# Patient Record
Sex: MALE | URBAN - METROPOLITAN AREA
[De-identification: names, ages, dates, MRNs, and addresses within clinical notes are randomized per-mention and may not be internally consistent; named-entity substitution may affect disease eponyms.]

---

## 2020-06-30 ENCOUNTER — APPOINTMENT (OUTPATIENT)
Dept: URBAN - METROPOLITAN AREA CLINIC 272 | Age: 60
Setting detail: DERMATOLOGY
End: 2020-07-02

## 2020-06-30 DIAGNOSIS — B35.4 TINEA CORPORIS: ICD-10-CM

## 2020-06-30 DIAGNOSIS — D485 NEOPLASM OF UNCERTAIN BEHAVIOR OF SKIN: ICD-10-CM

## 2020-06-30 PROBLEM — D48.5 NEOPLASM OF UNCERTAIN BEHAVIOR OF SKIN: Status: ACTIVE | Noted: 2020-06-30

## 2020-06-30 PROCEDURE — 69100 BIOPSY OF EXTERNAL EAR: CPT

## 2020-06-30 PROCEDURE — OTHER BIOPSY BY SHAVE METHOD: OTHER

## 2020-06-30 PROCEDURE — 99202 OFFICE O/P NEW SF 15 MIN: CPT | Mod: 25

## 2020-06-30 PROCEDURE — OTHER PRESCRIPTION: OTHER

## 2020-06-30 RX ORDER — CICLOPIROX OLAMINE 7.7 MG/G
CREAM TOPICAL
Qty: 1 | Refills: 2 | Status: ERX | COMMUNITY
Start: 2020-06-30

## 2020-06-30 ASSESSMENT — LOCATION SIMPLE DESCRIPTION DERM: LOCATION SIMPLE: LEFT EAR

## 2020-06-30 ASSESSMENT — LOCATION DETAILED DESCRIPTION DERM: LOCATION DETAILED: LEFT SUPERIOR HELIX

## 2020-06-30 ASSESSMENT — LOCATION ZONE DERM: LOCATION ZONE: EAR

## 2020-06-30 NOTE — PROCEDURE: BIOPSY BY SHAVE METHOD
Depth Of Biopsy: dermis
Additional Anesthesia Volume In Cc (Will Not Render If 0): 0
Validate Triangulation: No
Billing Type: Third-Party Bill
Biopsy Type: H and E
Detail Level: Detailed
Wound Care: Petrolatum
Consent: Written consent was obtained and risks were reviewed including but not limited to scarring, infection, bleeding, scabbing, incomplete removal, nerve damage and allergy to anesthesia.
Electrodesiccation Text: The wound bed was treated with electrodesiccation after the biopsy was performed.
Anesthesia Type: 1% lidocaine with epinephrine
Cryotherapy Text: The wound bed was treated with cryotherapy after the biopsy was performed.
Curettage Text: The wound bed was treated with curettage after the biopsy was performed.
Validate Note Data (See Information Below): Yes
Information: Selecting Yes will display possible errors in your note based on the variables you have selected. This validation is only offered as a suggestion for you. PLEASE NOTE THAT THE VALIDATION TEXT WILL BE REMOVED WHEN YOU FINALIZE YOUR NOTE. IF YOU WANT TO FAX A PRELIMINARY NOTE YOU WILL NEED TO TOGGLE THIS TO 'NO' IF YOU DO NOT WANT IT IN YOUR FAXED NOTE.
Silver Nitrate Text: The wound bed was treated with silver nitrate after the biopsy was performed.
Dressing: bandage
Size Of Lesion In Cm: 0.9
Hemostasis: Electrocautery
Type Of Destruction Used: Curettage
Electrodesiccation And Curettage Text: The wound bed was treated with electrodesiccation and curettage after the biopsy was performed.
Biopsy Method: 10 blade
Anesthesia Volume In Cc (Will Not Render If 0): 0.5
Notification Instructions: Patient will be notified of biopsy results. However, patient instructed to call the office if not contacted within 2 weeks.
Post-Care Instructions: I reviewed with the patient in detail post-care instructions. Patient is to keep the biopsy site dry overnight, and then apply bacitracin twice daily until healed. Patient may apply hydrogen peroxide soaks to remove any crusting.

## 2020-07-20 ENCOUNTER — RX ONLY (RX ONLY)
Age: 60
End: 2020-07-20

## 2020-07-20 ENCOUNTER — APPOINTMENT (OUTPATIENT)
Dept: URBAN - METROPOLITAN AREA CLINIC 272 | Age: 60
Setting detail: DERMATOLOGY
End: 2020-07-20

## 2020-07-20 PROBLEM — C44.229 SQUAMOUS CELL CARCINOMA OF SKIN OF LEFT EAR AND EXTERNAL AURICULAR CANAL: Status: ACTIVE | Noted: 2020-07-20

## 2020-07-20 PROCEDURE — 17311 MOHS 1 STAGE H/N/HF/G: CPT

## 2020-07-20 PROCEDURE — OTHER MOHS SURGERY: OTHER

## 2020-07-20 RX ORDER — DOXYCYCLINE HYCLATE 100 MG/1
CAPSULE, GELATIN COATED ORAL
Qty: 14 | Refills: 0 | Status: ERX | COMMUNITY
Start: 2020-07-20

## 2020-07-20 NOTE — PROCEDURE: MOHS SURGERY
Ear Wedge Repair Text: A wedge excision was completed by carrying down an excision through the full thickness of the ear and cartilage with an inward facing Burow's triangle. The wound was then closed in a layered fashion. Discharged

## 2020-07-20 NOTE — PROCEDURE: MOHS SURGERY
REID Wolfe Epidermal Autograft Text: The defect edges were debeveled with a #15 scalpel blade.  Given the location of the defect, shape of the defect and the proximity to free margins an epidermal autograft was deemed most appropriate.  Using a sterile surgical marker, the primary defect shape was transferred to the donor site. The epidermal graft was then harvested.  The skin graft was then placed in the primary defect and oriented appropriately.

## 2020-07-20 NOTE — PROCEDURE: MOHS SURGERY
5U PA O-Z Flap Text: The defect edges were debeveled with a #15 scalpel blade.  Given the location of the defect, shape of the defect and the proximity to free margins an O-Z flap was deemed most appropriate.  Using a sterile surgical marker, an appropriate transposition flap was drawn incorporating the defect and placing the expected incisions within the relaxed skin tension lines where possible. The area thus outlined was incised deep to adipose tissue with a #15 scalpel blade.  The skin margins were undermined to an appropriate distance in all directions utilizing iris scissors.

## 2020-07-20 NOTE — PROCEDURE: MOHS SURGERY
Assessment/Plan:  Fingerstick hemoglobin A1c  6 6  Patient to continue present treatment  Patient instructed to follow a low-fat, low-salt and a low sugar/carbohydrate diet and get regular exercise walking with use of his cane as tolerated  Recommend home glucose monitoring regularly  Patient instructed to elevate legs p r n  Tobias Mack Return to the office in 3 months  Diabetes mellitus type 2, uncontrolled ([address] )  Lab Results   Component Value Date    HGBA1C 6 4 (H) 01/05/2018    Stable with last hemoglobin A1c 6 4  Fingerstick hemoglobin A1c obtain today  Continue present treatment and follow a low sugar and low-carbohydrate diet  Recommend home glucose monitoring  No results for input(s): POCGLU in the last 72 hours  Blood Sugar Average: Last 72 hrs:      Arteriosclerotic coronary artery disease  Stable  Continue present treatment  Hypertension  BP controlled  Continue present treatment and follow a low-salt diet more carefully  Osteoarthritis  Symptomatic  Continue present treatment  Hyperlipidemia  Lipids well controlled on generic Vytorin  Continue present treatment and follow a low-fat low-cholesterol diet  Vitamin D deficiency  Continue vitamin-D supplement  Diagnoses and all orders for this visit:    Uncontrolled type 2 diabetes mellitus with diabetic neuropathy, without long-term current use of insulin (HCC)  -     HEMOGLOBIN A1C W/ EAG ESTIMATION  -     POCT hemoglobin A1c    Essential hypertension    Hyperlipidemia, unspecified hyperlipidemia type    Primary osteoarthritis involving multiple joints    Arteriosclerotic coronary artery disease    Spinal stenosis of lumbar region with neurogenic claudication    Vitamin D deficiency          Subjective:      Patient ID: Gonzalez Timmons is a 78 y o  male  Patient is here for routine appointment for chronic conditions and he is not fasting today    Patient was seen in emergency room 1 month ago for dizziness and had labs done at that time   Dizziness has since resolved  Patient has been feeling fairly well overall  No regular exercise program although patient remains fairly active doing [employer] work  He is limited in his activities secondary to arthritis  Home glucose monitoring done rarely reveals fasting blood sugars 150-160  Patient is up-to-date on diabetic eye exam and foot exam       Diabetes   He presents for his follow-up diabetic visit  He has type 2 diabetes mellitus  His disease course has been stable  There are no hypoglycemic associated symptoms  Pertinent negatives for hypoglycemia include no headaches  Associated symptoms include foot paresthesias  Pertinent negatives for diabetes include no blurred vision, no chest pain, no fatigue, no foot ulcerations, no polydipsia, no polyuria, no visual change, no weakness and no weight loss  There are no hypoglycemic complications  Symptoms are stable  Diabetic complications include heart disease, peripheral neuropathy and PVD  Pertinent negatives for diabetic complications include no CVA  Risk factors for coronary artery disease include diabetes mellitus, dyslipidemia, hypertension, male sex and tobacco exposure  Current diabetic treatment includes oral agent (dual therapy)  He is compliant with treatment all of the time  His weight is stable  He is following a generally healthy diet  He participates in exercise intermittently  His breakfast blood glucose is taken between 7-8 am  His breakfast blood glucose range is generally 140-180 mg/dl  An ACE inhibitor/angiotensin II receptor blocker is being taken  He sees a podiatrist Eye exam is current  Hypertension   This is a chronic problem  The problem is controlled  Associated symptoms include peripheral edema  Pertinent negatives include no anxiety, blurred vision, chest pain, headaches, malaise/fatigue, orthopnea, palpitations, PND or shortness of breath  Past treatments include ACE inhibitors, diuretics and beta blockers   The current treatment provides significant improvement  There are no compliance problems  Hypertensive end-organ damage includes CAD/MI and PVD  There is no history of CVA  Hyperlipidemia   This is a chronic problem  The problem is controlled  Recent lipid tests were reviewed and are normal  Exacerbating diseases include diabetes  He has no history of hypothyroidism  Associated symptoms include a focal sensory loss and leg pain  Pertinent negatives include no chest pain, focal weakness, myalgias or shortness of breath  Current antihyperlipidemic treatment includes ezetimibe and statins  The current treatment provides significant improvement of lipids  There are no compliance problems  The following portions of the patient's history were reviewed and updated as appropriate: allergies, current medications, past family history, past medical history, past social history, past surgical history and problem list     Review of Systems   Constitutional: Negative for fatigue, malaise/fatigue and weight loss  Eyes: Negative for blurred vision  Respiratory: Negative for shortness of breath  Cardiovascular: Negative for chest pain, palpitations, orthopnea and PND  Endocrine: Negative for polydipsia and polyuria  Musculoskeletal: Negative for myalgias  Neurological: Negative for focal weakness, weakness and headaches  Objective:      /68   Pulse 64   Temp 97 7 °F (36 5 °C) (Tympanic)   Resp 16   Ht 5' 8 5" (1 74 m)   Wt 86 2 kg (190 lb)   BMI 28 47 kg/m²          Physical Exam   Constitutional: He is oriented to person, place, and time  He appears well-developed and well-nourished  HENT:   Head: Normocephalic  Right Ear: External ear normal    Left Ear: External ear normal    Nose: Nose normal    Mouth/Throat: Oropharynx is clear and moist    Eyes: Conjunctivae are normal  No scleral icterus  Neck: Neck supple  No thyromegaly present  Cardiovascular: Normal rate and regular rhythm  Pulmonary/Chest: Effort normal and breath sounds normal    Abdominal: Soft  There is no tenderness  Musculoskeletal: He exhibits edema and tenderness  Lymphadenopathy:     He has no cervical adenopathy  Neurological: He is alert and oriented to person, place, and time  Skin: Skin is warm and dry  Psychiatric: He has a normal mood and affect  Rotation Flap Text: The defect edges were debeveled with a #15 scalpel blade.  Given the location of the defect, shape of the defect and the proximity to free margins a rotation flap was deemed most appropriate.  Using a sterile surgical marker, an appropriate rotation flap was drawn incorporating the defect and placing the expected incisions within the relaxed skin tension lines where possible.    The area thus outlined was incised deep to adipose tissue with a #15 scalpel blade.  The skin margins were undermined to an appropriate distance in all directions utilizing iris scissors.

## 2021-08-06 ENCOUNTER — APPOINTMENT (OUTPATIENT)
Dept: URBAN - METROPOLITAN AREA CLINIC 272 | Age: 61
Setting detail: DERMATOLOGY
End: 2021-08-07

## 2021-08-06 DIAGNOSIS — T300 BLISTERS, EPIDERMAL LOSS [SECOND DEGREE], UNSPECIFIED SITE: ICD-10-CM

## 2021-08-06 DIAGNOSIS — B35.4 TINEA CORPORIS: ICD-10-CM

## 2021-08-06 DIAGNOSIS — I87.2 VENOUS INSUFFICIENCY (CHRONIC) (PERIPHERAL): ICD-10-CM

## 2021-08-06 PROBLEM — T24.232A BURN OF SECOND DEGREE OF LEFT LOWER LEG, INITIAL ENCOUNTER: Status: ACTIVE | Noted: 2021-08-06

## 2021-08-06 PROBLEM — T24.231A BURN OF SECOND DEGREE OF RIGHT LOWER LEG, INITIAL ENCOUNTER: Status: ACTIVE | Noted: 2021-08-06

## 2021-08-06 PROCEDURE — OTHER PHOTO-DOCUMENTATION: OTHER

## 2021-08-06 PROCEDURE — OTHER COUNSELING: OTHER

## 2021-08-06 PROCEDURE — OTHER PRESCRIPTION: OTHER

## 2021-08-06 PROCEDURE — OTHER ADDITIONAL NOTES: OTHER

## 2021-08-06 PROCEDURE — 99214 OFFICE O/P EST MOD 30 MIN: CPT

## 2021-08-06 RX ORDER — ECONAZOLE NITRATE 10 MG/G
CREAM TOPICAL
Qty: 1 | Refills: 2 | Status: ERX | COMMUNITY
Start: 2021-08-06

## 2021-08-06 RX ORDER — MUPIROCIN 20 MG/G
OINTMENT TOPICAL
Qty: 2 | Refills: 3 | Status: ERX | COMMUNITY
Start: 2021-08-06

## 2021-08-06 RX ORDER — PENTOXIFYLLINE 400 MG/1
TABLET, EXTENDED RELEASE ORAL
Qty: 90 | Refills: 3 | Status: ERX | COMMUNITY
Start: 2021-08-06

## 2021-08-06 ASSESSMENT — LOCATION ZONE DERM: LOCATION ZONE: LEG

## 2021-08-06 ASSESSMENT — LOCATION DETAILED DESCRIPTION DERM
LOCATION DETAILED: LEFT DISTAL PRETIBIAL REGION
LOCATION DETAILED: RIGHT DISTAL PRETIBIAL REGION

## 2021-08-06 ASSESSMENT — LOCATION SIMPLE DESCRIPTION DERM
LOCATION SIMPLE: LEFT PRETIBIAL REGION
LOCATION SIMPLE: RIGHT PRETIBIAL REGION
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
## 2021-08-10 ENCOUNTER — RX ONLY (RX ONLY)
Age: 61
End: 2021-08-10

## 2021-08-10 RX ORDER — KETOCONAZOLE 20 MG/G
CREAM TOPICAL
Qty: 1 | Refills: 3 | Status: ERX | COMMUNITY
Start: 2021-08-10

## 2021-09-10 ENCOUNTER — APPOINTMENT (OUTPATIENT)
Dept: URBAN - METROPOLITAN AREA CLINIC 272 | Age: 61
Setting detail: DERMATOLOGY
End: 2021-09-11

## 2021-09-10 DIAGNOSIS — I87.2 VENOUS INSUFFICIENCY (CHRONIC) (PERIPHERAL): ICD-10-CM

## 2021-09-10 DIAGNOSIS — B35.4 TINEA CORPORIS: ICD-10-CM

## 2021-09-10 PROCEDURE — OTHER PRESCRIPTION MEDICATION MANAGEMENT: OTHER

## 2021-09-10 PROCEDURE — OTHER ADDITIONAL NOTES: OTHER

## 2021-09-10 PROCEDURE — OTHER PRESCRIPTION: OTHER

## 2021-09-10 PROCEDURE — 99213 OFFICE O/P EST LOW 20 MIN: CPT

## 2021-09-10 PROCEDURE — OTHER COUNSELING: OTHER

## 2021-09-10 RX ORDER — PENTOXIFYLLINE 400 MG/1
TABLET, EXTENDED RELEASE ORAL
Qty: 90 | Refills: 3 | Status: ERX
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
## 2022-01-14 ENCOUNTER — APPOINTMENT (OUTPATIENT)
Dept: URBAN - METROPOLITAN AREA CLINIC 272 | Age: 62
Setting detail: DERMATOLOGY
End: 2022-01-15

## 2022-01-14 DIAGNOSIS — D485 NEOPLASM OF UNCERTAIN BEHAVIOR OF SKIN: ICD-10-CM

## 2022-01-14 DIAGNOSIS — B35.4 TINEA CORPORIS: ICD-10-CM

## 2022-01-14 DIAGNOSIS — I87.2 VENOUS INSUFFICIENCY (CHRONIC) (PERIPHERAL): ICD-10-CM

## 2022-01-14 PROBLEM — D48.5 NEOPLASM OF UNCERTAIN BEHAVIOR OF SKIN: Status: ACTIVE | Noted: 2022-01-14

## 2022-01-14 PROCEDURE — 99214 OFFICE O/P EST MOD 30 MIN: CPT | Mod: 25

## 2022-01-14 PROCEDURE — OTHER COUNSELING: OTHER

## 2022-01-14 PROCEDURE — OTHER ADDITIONAL NOTES: OTHER

## 2022-01-14 PROCEDURE — OTHER PRESCRIPTION: OTHER

## 2022-01-14 PROCEDURE — OTHER PRESCRIPTION MEDICATION MANAGEMENT: OTHER

## 2022-01-14 PROCEDURE — 69100 BIOPSY OF EXTERNAL EAR: CPT

## 2022-01-14 PROCEDURE — OTHER BIOPSY BY SHAVE METHOD: OTHER

## 2022-01-14 RX ORDER — PENTOXIFYLLINE 400 MG/1
TABLET, EXTENDED RELEASE ORAL
Qty: 90 | Refills: 3 | Status: ERX

## 2022-01-14 RX ORDER — KETOCONAZOLE 20 MG/G
CREAM TOPICAL DAILY
Qty: 60 | Refills: 6 | Status: ERX

## 2022-01-14 ASSESSMENT — LOCATION ZONE DERM: LOCATION ZONE: EAR

## 2022-01-14 ASSESSMENT — LOCATION DETAILED DESCRIPTION DERM: LOCATION DETAILED: LEFT INFERIOR HELIX

## 2022-01-14 ASSESSMENT — LOCATION SIMPLE DESCRIPTION DERM: LOCATION SIMPLE: LEFT EAR

## 2022-01-14 NOTE — PROCEDURE: BIOPSY BY SHAVE METHOD
Hide Topical Anesthesia?: No
Information: Selecting Yes will display possible errors in your note based on the variables you have selected. This validation is only offered as a suggestion for you. PLEASE NOTE THAT THE VALIDATION TEXT WILL BE REMOVED WHEN YOU FINALIZE YOUR NOTE. IF YOU WANT TO FAX A PRELIMINARY NOTE YOU WILL NEED TO TOGGLE THIS TO 'NO' IF YOU DO NOT WANT IT IN YOUR FAXED NOTE.
Dressing: bandage
Curettage Text: The wound bed was treated with curettage after the biopsy was performed.
Anesthesia Volume In Cc (Will Not Render If 0): 0.5
Silver Nitrate Text: The wound bed was treated with silver nitrate after the biopsy was performed.
Billing Type: Third-Party Bill
Electrodesiccation And Curettage Text: The wound bed was treated with electrodesiccation and curettage after the biopsy was performed.
Additional Anesthesia Volume In Cc (Will Not Render If 0): 0
Was A Bandage Applied: Yes
Biopsy Type: H and E
Hemostasis: Electrocautery
Consent: Written consent was obtained and risks were reviewed including but not limited to scarring, infection, bleeding, scabbing, incomplete removal, nerve damage and allergy to anesthesia.
Detail Level: Detailed
Size Of Lesion In Cm: 0.8
Notification Instructions: Patient will be notified of biopsy results. However, patient instructed to call the office if not contacted within 2 weeks.
Anesthesia Type: 1% lidocaine without epinephrine
Post-Care Instructions: I reviewed with the patient in detail post-care instructions. Patient is to keep the biopsy site dry overnight, and then apply bacitracin twice daily until healed. Patient may apply hydrogen peroxide soaks to remove any crusting.
Electrodesiccation Text: The wound bed was treated with electrodesiccation after the biopsy was performed.
Wound Care: Petrolatum
Type Of Destruction Used: Curettage
Biopsy Method: Personna blade
Depth Of Biopsy: dermis
Cryotherapy Text: The wound bed was treated with cryotherapy after the biopsy was performed.
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
## 2022-03-31 ENCOUNTER — RX ONLY (RX ONLY)
Age: 62
End: 2022-03-31

## 2022-03-31 ENCOUNTER — APPOINTMENT (OUTPATIENT)
Dept: URBAN - METROPOLITAN AREA CLINIC 272 | Age: 62
Setting detail: DERMATOLOGY
End: 2022-03-31

## 2022-03-31 DIAGNOSIS — I87.2 VENOUS INSUFFICIENCY (CHRONIC) (PERIPHERAL): ICD-10-CM

## 2022-03-31 DIAGNOSIS — L57.0 ACTINIC KERATOSIS: ICD-10-CM

## 2022-03-31 PROBLEM — C44.229 SQUAMOUS CELL CARCINOMA OF SKIN OF LEFT EAR AND EXTERNAL AURICULAR CANAL: Status: ACTIVE | Noted: 2022-03-31

## 2022-03-31 PROCEDURE — 17311 MOHS 1 STAGE H/N/HF/G: CPT

## 2022-03-31 PROCEDURE — 99214 OFFICE O/P EST MOD 30 MIN: CPT | Mod: 25

## 2022-03-31 PROCEDURE — OTHER ADDITIONAL NOTES: OTHER

## 2022-03-31 PROCEDURE — OTHER MOHS SURGERY: OTHER

## 2022-03-31 RX ORDER — FLUOROURACIL 2 G/40G
CREAM TOPICAL
Qty: 40 | Refills: 0 | Status: ERX | COMMUNITY
Start: 2022-03-31

## 2022-03-31 RX ORDER — DOXYCYCLINE HYCLATE 100 MG/1
CAPSULE, GELATIN COATED ORAL
Qty: 14 | Refills: 0 | Status: ERX | COMMUNITY
Start: 2022-03-31

## 2022-03-31 ASSESSMENT — LOCATION DETAILED DESCRIPTION DERM
LOCATION DETAILED: RIGHT SUPERIOR CRUS OF ANTIHELIX
LOCATION DETAILED: LEFT DISTAL PRETIBIAL REGION
LOCATION DETAILED: RIGHT DISTAL PRETIBIAL REGION
LOCATION DETAILED: LEFT SUPERIOR CRUS OF ANTIHELIX

## 2022-03-31 ASSESSMENT — LOCATION SIMPLE DESCRIPTION DERM
LOCATION SIMPLE: LEFT EAR
LOCATION SIMPLE: RIGHT PRETIBIAL REGION
LOCATION SIMPLE: LEFT PRETIBIAL REGION
LOCATION SIMPLE: RIGHT EAR

## 2022-03-31 ASSESSMENT — LOCATION ZONE DERM
LOCATION ZONE: LEG
LOCATION ZONE: EAR

## 2022-03-31 NOTE — PROCEDURE: MOHS SURGERY
144 Consent (Temporal Branch)/Introductory Paragraph: The rationale for Mohs was explained to the patient and consent was obtained. The risks, benefits and alternatives to therapy were discussed in detail. Specifically, the risks of damage to the temporal branch of the facial nerve, infection, scarring, bleeding, prolonged wound healing, incomplete removal, allergy to anesthesia, and recurrence were addressed. Prior to the procedure, the treatment site was clearly identified and confirmed by the patient. All components of Universal Protocol/PAUSE Rule completed.

## 2022-03-31 NOTE — PROCEDURE: MOHS SURGERY
Pt arrived to [facility] ED with a variety of complaints. Same is dishevel, unkempt, stated that he was homeless and wanted to be fed. When asked if he had medical issues to review, pt stated yes, but thereafter refused to change into hospital gown to be examined. Mart-1 - Negative Histology Text: MART-1 staining demonstrates a normal density and pattern of melanocytes along the dermal-epidermal junction. The surgical margins are negative for tumor cells.

## 2022-03-31 NOTE — PROCEDURE: ADDITIONAL NOTES
Additional Notes: Efudex 5% cream. Apply twice daily for two weeks to both ears. Handout was given and reviewed. Instructed to wait until the surgery site had healed before starting topical therapy
Additional Notes: We discussed that the root cause of stasis dermatitis was and imbalance in fluid status leading to chronic lower extremity edema. Our treatments as dermatology will only help to decrease the symptoms however if he continues to have pitting edema he will not have resolution of the eczematous changes\\n\\nWe discussed that I will relay this information to his primary care provider and recommend they consider starting him on a diuretic\\n\\nWe also discussed elevation of the lower extremities when possible and compression stockings. He may continue to use the topical steroids for symptomatic relief of itching
Render Risk Assessment In Note?: yes
Detail Level: Simple
Patient Management Risk Assessment: Moderate

## 2023-04-14 ENCOUNTER — APPOINTMENT (OUTPATIENT)
Dept: URBAN - METROPOLITAN AREA CLINIC 272 | Age: 63
Setting detail: DERMATOLOGY
End: 2023-04-14

## 2023-04-14 DIAGNOSIS — I87.2 VENOUS INSUFFICIENCY (CHRONIC) (PERIPHERAL): ICD-10-CM

## 2023-04-14 DIAGNOSIS — D485 NEOPLASM OF UNCERTAIN BEHAVIOR OF SKIN: ICD-10-CM

## 2023-04-14 DIAGNOSIS — Z85.828 PERSONAL HISTORY OF OTHER MALIGNANT NEOPLASM OF SKIN: ICD-10-CM

## 2023-04-14 PROBLEM — D48.5 NEOPLASM OF UNCERTAIN BEHAVIOR OF SKIN: Status: ACTIVE | Noted: 2023-04-14

## 2023-04-14 PROCEDURE — OTHER ADDITIONAL NOTES: OTHER

## 2023-04-14 PROCEDURE — 99213 OFFICE O/P EST LOW 20 MIN: CPT | Mod: 25

## 2023-04-14 PROCEDURE — 69100 BIOPSY OF EXTERNAL EAR: CPT

## 2023-04-14 PROCEDURE — OTHER COUNSELING: OTHER

## 2023-04-14 PROCEDURE — OTHER MIPS QUALITY: OTHER

## 2023-04-14 PROCEDURE — OTHER BIOPSY BY SHAVE METHOD: OTHER

## 2023-04-14 ASSESSMENT — LOCATION DETAILED DESCRIPTION DERM
LOCATION DETAILED: LEFT DISTAL PRETIBIAL REGION
LOCATION DETAILED: RIGHT SUPERIOR POSTERIOR HELIX
LOCATION DETAILED: RIGHT DISTAL PRETIBIAL REGION

## 2023-04-14 ASSESSMENT — LOCATION ZONE DERM
LOCATION ZONE: EAR
LOCATION ZONE: LEG

## 2023-04-14 ASSESSMENT — LOCATION SIMPLE DESCRIPTION DERM
LOCATION SIMPLE: RIGHT EAR
LOCATION SIMPLE: LEFT PRETIBIAL REGION
LOCATION SIMPLE: RIGHT PRETIBIAL REGION

## 2023-04-14 NOTE — PROCEDURE: ADDITIONAL NOTES
Detail Level: Simple
Render Risk Assessment In Note?: yes
Patient Management Risk Assessment: Moderate
Additional Notes: We discussed that the root cause of stasis dermatitis was and imbalance in fluid status leading to chronic lower extremity edema. Our treatments as dermatology will only help to decrease the symptoms however if he continues to have pitting edema he will not have resolution of the eczematous changes\\n\\nWe discussed that I will relay this information to his primary care provider and recommend they consider starting him on a diuretic\\n\\nWe also discussed elevation of the lower extremities when possible and compression stockings. He may continue to use the topical steroids for symptomatic relief of itching

## 2023-04-14 NOTE — PROCEDURE: BIOPSY BY SHAVE METHOD
Hide Second Anesthesia?: No
Information: Selecting Yes will display possible errors in your note based on the variables you have selected. This validation is only offered as a suggestion for you. PLEASE NOTE THAT THE VALIDATION TEXT WILL BE REMOVED WHEN YOU FINALIZE YOUR NOTE. IF YOU WANT TO FAX A PRELIMINARY NOTE YOU WILL NEED TO TOGGLE THIS TO 'NO' IF YOU DO NOT WANT IT IN YOUR FAXED NOTE.
Silver Nitrate Text: The wound bed was treated with silver nitrate after the biopsy was performed.
Biopsy Method: Personna blade
Anesthesia Volume In Cc (Will Not Render If 0): 0.5
Post-Care Instructions: I reviewed with the patient in detail post-care instructions. Patient is to keep the biopsy site dry overnight, and then apply bacitracin twice daily until healed. Patient may apply hydrogen peroxide soaks to remove any crusting.
Curettage Text: The wound bed was treated with curettage after the biopsy was performed.
Depth Of Biopsy: dermis
Electrodesiccation And Curettage Text: The wound bed was treated with electrodesiccation and curettage after the biopsy was performed.
Validate Note Data (See Information Below): Yes
Billing Type: Third-Party Bill
Type Of Destruction Used: Curettage
Electrodesiccation Text: The wound bed was treated with electrodesiccation after the biopsy was performed.
Anesthesia Type: 1% lidocaine without epinephrine
Biopsy Type: H and E
X Size Of Lesion In Cm: 0
Wound Care: Petrolatum
Cryotherapy Text: The wound bed was treated with cryotherapy after the biopsy was performed.
Dressing: bandage
Notification Instructions: Patient will be notified of biopsy results. However, patient instructed to call the office if not contacted within 2 weeks.
Hemostasis: Electrocautery
Detail Level: Detailed
Consent: Written consent was obtained and risks were reviewed including but not limited to scarring, infection, bleeding, scabbing, incomplete removal, nerve damage and allergy to anesthesia.
Size Of Lesion In Cm: 0.8

## 2023-06-05 ENCOUNTER — APPOINTMENT (OUTPATIENT)
Dept: URBAN - METROPOLITAN AREA CLINIC 305 | Age: 63
Setting detail: DERMATOLOGY
End: 2023-06-06

## 2023-06-05 ENCOUNTER — RX ONLY (RX ONLY)
Age: 63
End: 2023-06-05

## 2023-06-05 PROBLEM — C44.212 BASAL CELL CARCINOMA OF SKIN OF RIGHT EAR AND EXTERNAL AURICULAR CANAL: Status: ACTIVE | Noted: 2023-06-05

## 2023-06-05 PROCEDURE — OTHER MOHS SURGERY: OTHER

## 2023-06-05 PROCEDURE — 17311 MOHS 1 STAGE H/N/HF/G: CPT

## 2023-06-05 PROCEDURE — 17312 MOHS ADDL STAGE: CPT

## 2023-06-05 RX ORDER — DOXYCYCLINE HYCLATE 100 MG/1
CAPSULE, GELATIN COATED ORAL
Qty: 14 | Refills: 0 | Status: ERX | COMMUNITY
Start: 2023-06-05

## 2023-06-05 NOTE — HPI: SKIN LESION (BASAL CELL CARCINOMA)
Is This A New Presentation, Or A Follow-Up?: Basal Cell Carcinoma
When Was Basal Cell Biopsied? (Optional): 4/14/23

## 2023-06-05 NOTE — PROCEDURE: MOHS SURGERY
Pt is here for   Chief Complaint   Patient presents with    Follow-up     HTN, CHOL, knee pain    Labs     pt states that she was told by her psyche doctor that she needs a UDS and labs     Referral Request     pt states that she would like to be referred to a new psyche doctor, does not feel she's receiving the care she needs      Pt denies pain at this time    1. Have you been to the ER, urgent care clinic since your last visit? Hospitalized since your last visit? No    2. Have you seen or consulted any other health care providers outside of the [address] [place] [address] since your last visit? Include any pap smears or colon screening.  No Intermediate Repair And Flap Additional Text (Will Appearing After The Standard Complex Repair Text): The intermediate repair was not sufficient to completely close the primary defect. The remaining additional defect was repaired with the flap mentioned below.

## 2023-06-05 NOTE — PROCEDURE: MOHS SURGERY
Student's Name:   Todd Bolden Birth Date  2019  Sex  male  Race/Ethnicity   School/Grade Level/ID#     Address:   [address]  [place] [phone]  Parent/Guardian             Telephone#                                            Home:                               Work:   IMMUNIZATIONS: To be completed by health care provider. The mo/da/yr for every dose administered is required. If a specific vaccine is medically contraindicated, a separate written statement must be attached by the health care responsible for completing the health examination explaining the medical reason for the contraindication.      Immunization History   Administered Date(s) Administered   • DTaP(INFANRIX) 01/24/2019   • DTaP/Hep B/IPV 2017, 2017, 2017   • Hep A, ped/adol, 2 dose 05/07/2018, 01/24/2019   • Hep B, adolescent or pediatric 2017   • Hib (PRP-T) 2017, 2017, 2017, 01/24/2019   • Influenza, injectable, quadrivalent, preservative-free 02/05/2018, 03/05/2018, 01/24/2019   • MMR 05/07/2018   • Pneumococcal Conjugate 13 Valent Vacc (Prevnar 13) 2017, 2017, 2017, 05/07/2018   • Rotavirus - pentavalent 2017, 2017, 2017   • Varicella 05/07/2018   Pended Date(s) Pended   • DTaP/IPV 08/25/2022   • Measles Mumps Rubella Varicella 08/25/2022      Immunizations given 8/25/2022: The vaccines marked as \"pended\" above were administered today.      Health care provider (MD, DO, APN, PA, school health professional, health official) verifying above immunization history must sign below. If adding dates to the above immunization history section, put your initials by date(s) and sign here.)  Signature                                                                 Title                                                Date  _____________________________________________________________________________________  Signature                                                                  Title                                                Date  _____________________________________________________________________________________   ALTERNATIVE PROOF OF IMMUNITY   1. Clinical diagnosis (measles, mumps, hepatitis B) is allowed when verified by physician and supported with lab confirmation.  Attach copy of lab result.    * MEASLES ([place])  MO   DA  YR          **MUMPS  MO   DA  YR             HEPATITIS B  MO   DA   YR           VARICELLA  MO   DA  YR      2. History of varicella (chickenpox) disease is acceptable if verified by health care provider, school health professional or health official.  Person signing below verifies that the parent/guardian’s description of varicella disease history is indicative of past infection and is accepting such history as documentation of disease.  Date of Disease                                  Signature                                                           Title   3. Laboratory Evidence of Immunity (check one)      __ Measles*         __ Mumps**        __ Rubella        __Varicella    (Attach copy of lab result)         *All measles cases diagnosed on or after July 1, 2002, must be confirmed by laboratory evidence.      **All mumps cases diagnosed on or after July 1, 2013, must be confirmed by laboratory evidence.     Completion of Alternative 1 or 3 MUST be accompanied by Labs & Physician Signature: ___________________________  Physician Statements of Immunity MUST be submitted to [facility] for review.     Certificates of [employer] Exemption to Immunizations or Physician Medical Statements of Medical Contraindication Are Reviewed   and Maintained by the School Authority     (11/2015)                                         (COMPLETE BOTH SIDES)                                  Approved [facility] 3/2016    HEALTH HISTORY      TO BE COMPLETED AND SIGNED BY PARENT/GUARDIAN AND VERIFIED BY HEALTH CARE PROVIDER  ALLERGIES: (Food, drug, insect,  Mohs Case Number:  other) is allergic to adhesive   (environmental) and dairy digestive.   Intolerant to wheat and sugar   MEDICATION: (List all prescribed or taken on a regular basis.) has a current medication list which includes the following prescription(s): VITAMIN D PO, EPINEPHrine (EPIPEN JR 2-EMORY) 0.15 MG/0.3ML auto-injector, and Melatonin 1 MG Tab.   Diagnosis of asthma?  Child wakes during night coughing? Yes    No  Yes    No  Loss of function of one of paired  organs?(eye/ear/kidney/testicle) Yes    No    Birth defects? Yes    No  Hospitalizations? Yes    No    Developmental delay? Yes    No   When? What for? Yes    No    Blood disorders? Hemophilia,  Sickle Cell, Other? Explain. Yes    No  Surgery? (List all.)  When? What for? Yes    No    Diabetes? Yes    No  Serious injury or illness? Yes    No    Head injury/Concussion/Passed out? Yes    No  TB skin test positive (past/present)? * Yes    No *If yes, [facility]   Seizures? What are they like?  Yes    No  TB disease (past or present)? * Yes    No *If yes, [facility]   Heart problem/Shortness of breath?  Yes    No  Tobacco use (type, frequency)?  Yes    No    Heart murmur/High blood pressure? Yes    No  Alcohol/Drug use?  Yes    No    Dizziness or chest pain with exercise? Yes    No  Family history of sudden death  before age 50? (Cause?) Yes    No    Eye/Vision problems? ______           __Glasses          __Contacts  Last exam by eye doctor ______  Other concerns? (crossed eye, drooping lids, squinting, difficulty reading) Dental?   __ Braces     __ Bridge     __ Plate   __Other   Ear/Hearing problems? Yes    No  Information may be shared with appropriate personnel for health and educational purposes.   Bone/Joint problem/injury/scoliosis? Yes    No  Parent/Guardian  Signature                                               Date   PHYSICAL EXAMINATION REQUIREMENTS   Entire section below to be completed by MD//APN/PA Head Circumference if  <2-3 years old - BP 92/60 (BP Location: LUE - Left upper extremity, Patient Position: Sitting, Cuff Size: Small Adult)   Pulse 120   Temp 97.6 °F (36.4 °C) (Temporal)   Resp (!) 20   Ht 3' 9.25\" (1.149 m)   Wt 21.8 kg (48 lb)   BMI 16.48 kg/m²   BSA 0.83 m²    79 %ile (Z= 0.80) based on CDC (Boys, 2-20 Years) BMI-for-age based on BMI available as of 8/25/2022.   DIABETES SCREENING (NOT REQUIRED FOR ) BMI>85% age/sex: No     And any two of the following: Family History: No  Ethnic Minority: No    Signs of Insulin Resistance (hypertension, dyslipidemia, polycystic ovarian syndrome, acanthosis nigricans): No  At Risk: No   LEAD RISK QUESTIONNAIRE Required for children age 6 months through 6 years enrolled in licensed or public school operated day care, , nursery school and/or . (Blood test required if resides in [place] or high risk zip code.)  Questionnaire Administered? Yes  Blood Test Indicated? No   Blood Test Date/Result:    TB SKIN OR BLOOD TEST Recommended only for children in high-risk groups including children immunosuppressed due to HIV infection or other conditions, frequent travel to or born in high prevalence countries or those exposed to adults in high-risk categories. See CDC guidelines. http://www.cdc.gov/tb/publications/factsheets/testing/TB_testing.htm.  Test Needed: No     Test performed: No                          Skin Test:    Date Read              /      /             Result:   Positive__      Negative__        mm________                          Blood Test: Date Reported       /      /               Result:  Positive__      Negative__      Value________  LAB TESTS (recommended) Date/Result  Date/Results   Hemoglobin or Hematocrit 12 (3/8/2021) Sickle Cell (when indicated)    Urinalysis NA Developmental Screening Tool Normal - ASQ        SYSTEM REVIEW Normal  Comments/Follow-up/Needs  Normal Comments/Follow-up/Needs   Skin  Yes  Endocrine Yes    Ears Yes                   Screening Result Gastrointestinal Yes    Eyes Yes                  Screening Result: Genito-Urinary Yes                                      LMP:    Nose Yes  Neurologic Yes    Throat Yes  Musculoskeletal Yes    Mouth/Dental Yes  Spinal Exam Yes    Cardiovascular/HTN Yes  Nutritional status Yes    Respiratory Yes Diagnosis of Asthma: No   Mental Health Yes    Currently Prescribed Asthma Medication:        No  Quick-relief medication (e.g. Short Acting Beta Antagonist)        No  Controller medication (e.g. inhaled corticosteroid) Other     NEEDS/MODIFICATIONS required in the school setting: No restrictions DIETARY Needs/Restrictions: avoid dairy (allergic) ,  Intolerant to sugar /Wheat   SPECIAL INSTRUCTIONS/DEVICES e.g. safety glasses, glass eye, chest protector for arrhythmia, pacemaker, prosthetic device, dental bridge, false teeth, athletic support/cup: No restrictions   MENTAL HEALTH/OTHER Is there anything else the school should know about this student?  If you would like to discuss this student’s health with school or school health personnel:  Not needed   EMERGENCY ACTION needed while at school due to child’s health condition (e.g. ,seizures, asthma, insect sting, food, peanut allergy, bleeding problem, diabetes, heart problem)?   If dairy ingested give children benadryl 10 ml once, if sugar and wheat just observe. If anaphylaxis take epipenjr   On the basis of the examination on this day, I approve this child’s participation in                                (If No or Modified please attach explanation.)  PHYSICAL EDUCATION:  Yes                               [employer]   Yes   Print Name  Ute Larry MD         Signature                                                                       Date: 8/25/2022   Address:  [facility] [place] [address] [facility] [place] [address] [place] [address] [phone]         (Complete  Both Sides)     Approved [facility] 3/2016

## 2023-10-18 ENCOUNTER — APPOINTMENT (OUTPATIENT)
Dept: URBAN - METROPOLITAN AREA CLINIC 305 | Age: 63
Setting detail: DERMATOLOGY
End: 2023-10-22

## 2023-10-18 DIAGNOSIS — Z85.828 PERSONAL HISTORY OF OTHER MALIGNANT NEOPLASM OF SKIN: ICD-10-CM

## 2023-10-18 DIAGNOSIS — L81.7 PIGMENTED PURPURIC DERMATOSIS: ICD-10-CM

## 2023-10-18 PROCEDURE — 99213 OFFICE O/P EST LOW 20 MIN: CPT

## 2023-10-18 PROCEDURE — OTHER MIPS QUALITY: OTHER

## 2023-10-18 PROCEDURE — OTHER COUNSELING: OTHER

## 2023-10-18 ASSESSMENT — LOCATION SIMPLE DESCRIPTION DERM
LOCATION SIMPLE: RIGHT PRETIBIAL REGION
LOCATION SIMPLE: LEFT CALF
LOCATION SIMPLE: RIGHT CALF
LOCATION SIMPLE: LEFT PRETIBIAL REGION

## 2023-10-18 ASSESSMENT — LOCATION DETAILED DESCRIPTION DERM
LOCATION DETAILED: LEFT DISTAL PRETIBIAL REGION
LOCATION DETAILED: RIGHT DISTAL PRETIBIAL REGION
LOCATION DETAILED: LEFT PROXIMAL CALF
LOCATION DETAILED: RIGHT PROXIMAL LATERAL CALF

## 2023-10-18 ASSESSMENT — LOCATION ZONE DERM: LOCATION ZONE: LEG

## 2023-10-18 NOTE — PROCEDURE: COUNSELING
Anesthesia Pre Eval Note    Anesthesia ROS/Med Hx    Overall Review:  EKG was reviewed and Echo was reviewed     Anesthetic Complication History:    History of postoperative nausea & vomiting    Pulmonary Review:    Positive for sleep apnea     Neuro/Psych Review:  Patient does not have a neuro/psych history       Cardiovascular Review:    Positive for hypertension  Positive for hyperlipidemia    GI/HEPATIC/RENAL Review:  Positive for hiatal hernia  Positive for GERD    End/Other Review:  Positive for diabetes - type 2  Positive for obesity class III - 40.00 - 49.99  Additional Results:  EKG:  Encounter Date: 03/15/23  -Electrocardiogram 12-Lead:        Result                      Value                           Ventricular Rate EKG/M*     89                              Atrial Rate (BPM)           89                              NE-Interval (MSEC)          164                             QRS-Interval (MSEC)         72                              QT-Interval (MSEC)          346                             QTc                         421                             P Axis (Degrees)            84                              R Axis (Degrees)            52                              T Axis (Degrees)            65                              REPORT TEXT                                             Sinus rhythm   with   premature supraventricular complexes   Septal infarct   , age undetermined   Abnormal ECG   When compared with ECG of   14-SEP-2022 15:12,   premature supraventricular complexes   are now   present   Confirmed by IVONNE MAC, BETTY TINOCO ([id number]) on 3/17/2023 11:47:31 AM    Echo:  Ejection Fraction       Date                     Value               Ref Range           Status                09/13/2022               68%                 >/=50% %            Final            ----------   ALLERGIES:   -- Nitrofurantoin Macrocrystal -- SHORTNESS OF BREATH and PRURITUS    --  THROAT itches   -- Phenergan Plain --  Other (See Comments)    --  paralysis   -- Promethazine Hcl -- Other (See Comments)    --  Pt could not function, could not move.  Felt very             weird.  Told not to take again.   -- Sulfa Antibiotics -- SHORTNESS OF BREATH   -- Dolasetron -- Other (See Comments)    --  Patient states that she was \"unable to move\".   -- Pain Relief -- VOMITING       Last Labs        Component                Value               Date/Time                  WBC                      10.9                03/15/2023 1121            RBC                      4.04                03/15/2023 1121            HGB                      11.3 (L)            03/15/2023 1121            HCT                      36.8                03/15/2023 1121            MCV                      91.1                03/15/2023 1121            MCH                      28.0                03/15/2023 1121            MCHC                     30.7 (L)            03/15/2023 1121            RDW-CV                   13.4                03/15/2023 1121            Sodium                   140                 03/15/2023 1121            Potassium                4.9                 03/15/2023 1121            Chloride                 103                 03/15/2023 1121            Carbon Dioxide           30                  03/15/2023 1121            Glucose                  140 (H)             03/15/2023 1121            BUN                      23 (H)              03/15/2023 1121            Creatinine               1.12 (H)            03/15/2023 1121            Glomerular Filtrati*     53 (L)              03/15/2023 1121            Calcium                  9.7                 03/15/2023 1121            PLT                      312                 03/15/2023 1121        Past Medical History:  No date: Cataracts, bilateral  12/14/2017: Dyslipidemia  12/14/2017: Essential hypertension, benign  No date: Gastroesophageal reflux disease  12/19/2018: Glaucoma suspect of both  eyes  No date: Hiatal hernia  No date: High cholesterol  07/08/2020: Hypertension      Comment:  Last Assessment & Plan:  Formatting of this note might                be different from the original. Dyslipidemia, stable, on                statins, no myalgias, tolerates meds well, reports no                side effects. Role of statins discussed, side effects                reviewed.  No date: Lichen simplex chronicus  11/13/2014: Macular pucker, left eye  2022: Non-toxic multinodular goiter      Comment:  FNA 2022 colloid nodules  No date: PONV (postoperative nausea and vomiting)  No date: PVD (posterior vitreous detachment), both eyes  No date: Sleep apnea      Comment:  no machine yet  12/18/2017: Type 2 diabetes mellitus without complication, without   long-term current use of insulin (CMD)    Past Surgical History:  No date: Biopsy of breast, incisional  01/15/2014: Bladder repair  11/09/2007: Colonoscopy diagnostic      Comment:  Colonoscopy, Dx  11/09/2007: Cystourethroscopy  No date: Hysterectomy  No date: Laser ablation of condylomas  02/13/2023: Open access colonoscopy      Comment:  egd  No date: Repair of rectocele  No date: Tonsillectomy  No date: Total abdom hysterectomy       Prior to Admission medications :  Medication cetirizine (ZyrTEC) 10 MG tablet, Sig Take 10 mg by mouth nightly., Start Date , End Date , Taking? Yes, Authorizing Provider Outside Provider    Medication rabeprazole (ACIPHEX) 20 MG tablet, Sig TAKE 1 TABLET(20 MG) BY MOUTH DAILY, Start Date 2/7/23, End Date , Taking? Yes, Authorizing Provider BACILIO Thomas    Medication folic acid (FOLATE) 1 MG tablet, Sig Take 1 tablet by mouth daily., Start Date 11/30/22, End Date , Taking? Yes, Authorizing Provider Jhonny Christensen MD    Medication atorvastatin (LIPITOR) 20 MG tablet, Sig Take 1 tablet by mouth daily., Start Date 11/30/22, End Date , Taking? Yes, Authorizing Provider Jhonny Christensen MD    Medication losartan (COZAAR) 50 MG  tablet, Sig Take 1 tablet by mouth daily., Start Date 11/14/22, End Date , Taking? Yes, Authorizing Provider Jhonny Christensen MD    Medication aspirin (SB Low Dose ASA EC) 81 MG EC tablet, Sig Take 1 tablet by mouth daily., Start Date 11/14/22, End Date , Taking? Yes, Authorizing Provider Jhonny Christensen MD    Medication glipiZIDE (GLUCOTROL) 5 MG tablet, Sig Take 1 tablet by mouth in the morning and 1 tablet in the evening. Take before meals., Start Date 9/8/22, End Date , Taking? Yes, Authorizing Provider Jhonny Christensen MD    Medication metFORMIN (GLUCOPHAGE-XR) 500 MG 24 hr tablet, Sig Take 2 tablets by mouth in the morning and 2 tablets in the evening., Start Date 9/7/22, End Date , Taking? Yes, Authorizing Provider Jhonny Christensen MD    Medication SITagliptan (JANUVIA) 100 MG tablet, Sig Take 1 tablet by mouth daily., Start Date 8/31/22, End Date , Taking? Yes, Authorizing Provider Jhonny Christensen MD    Medication metoPROLOL succinate (TOPROL-XL) 25 MG 24 hr tablet, Sig Take 1 tablet by mouth daily., Start Date 8/25/22, End Date 3/4/24, Taking? Yes, Authorizing Provider Izzy Dvuall MD    Medication Calcium Carbonate 1500 (600 Ca) MG Tab, Sig Take 2 tablets by mouth daily., Start Date , End Date , Taking? Yes, Authorizing Provider Outside Provider    Medication ferrous sulfate 325 (65 FE) MG EC tablet, Sig Take 325 mg by mouth daily., Start Date 2/2/22, End Date , Taking? Yes, Authorizing Provider Outside Provider    Medication vitamin B-12 (CYANOCOBALAMIN) 1000 MCG tablet, Sig Take 1,000 mcg by mouth daily., Start Date , End Date , Taking? Yes, Authorizing Provider Outside Provider    Medication ascorbic acid (VITAMIN C) 1000 MG tablet, Sig Take 1,000 mg by mouth daily., Start Date , End Date , Taking? Yes, Authorizing Provider Outside Provider    Medication cholecalciferol (VITAMIN D3) 1000 UNITS tablet, Sig Take 1,000 Units by mouth daily., Start Date , End Date , Taking? Yes, Authorizing Provider Outside  Provider    Medication hydroCORTisone (ANUSOL-HC) 2.5 % rectal cream, Sig Place 1 application rectally 2 times daily., Start Date 9/22/22, End Date , Taking? , Authorizing Provider BACILIO Thomas    Medication dicyclomine (BENTYL) 20 MG tablet, Sig Take 1 tablet by mouth 4 times daily as needed (for abdominal pain)., Start Date 9/22/22, End Date , Taking? , Authorizing Provider BACILIO Thomas    Medication hydroCORTisone (CORTIZONE) 2.5 % ointment, Sig APPLY TOPICALLY TO THE AFFECTED AREA TWICE DAILY, Start Date 8/15/22, End Date , Taking? , Authorizing Provider Outside Provider    Medication Lancets 28G Misc, Sig 1 each 2 times daily. DX-E11.9   One Touch Verio, Start Date 7/28/22, End Date , Taking? , Authorizing Provider Jhonny Christensen MD    Medication Naftifine HCl 1 % Gel, Sig , Start Date 5/31/22, End Date 5/31/23, Taking? , Authorizing Provider Outside Provider    Medication clobetasol (TEMOVATE) 0.05 % ointment, Sig APPLY TOPICALLY TO THE AFFECTED AREA TWICE DAILY, Start Date 7/18/22, End Date 7/18/23, Taking? , Authorizing Provider Outside Provider    Medication blood glucose test strip, Sig daily. Test blood sugar two times daily. Diagnosis: E11.9. Meter: One touch verio Flex, Start Date 7/27/22, End Date , Taking? , Authorizing Provider Jhonny Christensen MD            Relevant Problems   No relevant active problems       Physical Exam     Airway   Mallampati: II  Neck ROM: Full    Cardiovascular    Cardio Rhythm: Regular  Cardio Rate: Normal    General Assessment  General Assessment: Alert and oriented and No acute distress    Dental Exam  Dental exam normal    Pulmonary Exam    Breath sounds clear to auscultation:  Yes  Patient Demonstrates:  Non-labored Breathing    Abdominal Exam    Patient Demonstrates:  Obese      Anesthesia Plan:    ASA Status: 3  Anesthesia Type: General    Induction: Intravenous  Preferred Airway Type: ETT  Maintenance: Inhalational  Premedication: IV  Patient does not have  an implantable electronic device requiring post procedure programming.     Post-op Pain Management: Per Surgeon      Checklist  Reviewed: Lab Results, Patient Summary, Allergies, Past Med History, Medications, Beta Blocker Status, Nursing Notes, Problem list, NPO Status and EKG  Consent/Risks Discussed Statement:  The proposed anesthetic plan, including its risks and benefits, have been discussed with the Patient along with the risks and benefits of alternatives. Questions were encouraged and answered and the patient and/or representative understands and agrees to proceed.    I have discussed elements of the patient's history or examination, as noted above and/or as follows, that place the patient at higher risk of complications; BMI> 30 (obesity), heart disease, vascular disease and sleep apnea.    I discussed with the patient (and/or patient's legal representative) the risks and benefits of the proposed anesthesia plan, General, which may include services performed by other anesthesia providers.    Alternative anesthesia plans, if available, were reviewed with the patient (and/or patient's legal representative). Discussion has been held with the patient (and/or patient's legal representative) regarding risks of anesthesia, which include allergic reaction, anxiety, aspiration, back pain, depressed breathing, conversion to general anesthesia, emergence delirium, eye injury, headache, hypotension, nausea, memory loss, intra-operative awareness, vomiting, oral injury, nerve injury, sore throat, dental injury, bleeding/hematoma and persistent pain and emergent situations that may require change in anesthesia plan.    The patient (and/or patient's legal representative) has indicated understanding, his/her questions have been answered, and he/she wishes to proceed with the planned anesthetic.    Blood Products: Not Anticipated     Detail Level: Detailed